# Patient Record
Sex: MALE
[De-identification: names, ages, dates, MRNs, and addresses within clinical notes are randomized per-mention and may not be internally consistent; named-entity substitution may affect disease eponyms.]

---

## 2021-07-01 PROBLEM — Z00.00 ENCOUNTER FOR PREVENTIVE HEALTH EXAMINATION: Status: ACTIVE | Noted: 2021-07-01

## 2021-07-02 ENCOUNTER — APPOINTMENT (OUTPATIENT)
Dept: ORTHOPEDIC SURGERY | Facility: CLINIC | Age: 31
End: 2021-07-02
Payer: SELF-PAY

## 2021-07-02 ENCOUNTER — NON-APPOINTMENT (OUTPATIENT)
Age: 31
End: 2021-07-02

## 2021-07-02 VITALS
SYSTOLIC BLOOD PRESSURE: 125 MMHG | DIASTOLIC BLOOD PRESSURE: 72 MMHG | WEIGHT: 195 LBS | BODY MASS INDEX: 25.03 KG/M2 | HEART RATE: 63 BPM | HEIGHT: 74 IN

## 2021-07-02 DIAGNOSIS — Z02.5 ENCOUNTER FOR EXAMINATION FOR PARTICIPATION IN SPORT: ICD-10-CM

## 2021-07-02 PROCEDURE — 99203 OFFICE O/P NEW LOW 30 MIN: CPT

## 2021-07-02 NOTE — HISTORY OF PRESENT ILLNESS
[de-identified] : Patient is here for sports physical.  Patient is participating in professional lacrosse league, and he needed a local physician to evaluate him and provide a sports physical and subsequent clearance to participate in lacrosse activity.  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  Patient has history of left shoulder labral repair performed while in college, he has had no issues since full recovery from that surgery.  Patient has no musculoskeletal complaints today.  Patient denies any past medical history, states he is not taking any medications at this time.  Patient feels well and ready to play lacrosse.\par \par The patient's past medical history, past surgical history, medications and allergies were reviewed by me today and documented accordingly. In addition, the patient's family and social history, which were noncontributory to this visit, were reviewed also. Intake form was reviewed. The patient has no family history of arthritis.

## 2021-07-02 NOTE — DISCUSSION/SUMMARY
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  He has no abnormal findings on clinical exam.  No disqualifying factors that would prevent him from participating in sports.  He is given full clearance to participate in sport activity at this time without restrictions.  Follow up as needed.  Patient appreciates and agrees with current plan.\par \par This note was generated using dragon medical dictation software.  A reasonable effort has been made for proofreading its contents, but typos may still remain.  If there are any questions or points of clarification needed please notify my office.\par

## 2021-07-02 NOTE — PHYSICAL EXAM
[de-identified] : Constitutional: Well-nourished, well-developed, No acute distress\par Respiratory:  Good respiratory effort, no SOB\par Lymphatic: No regional lymphadenopathy, no lymphedema\par Psychiatric: Pleasant and normal affect, alert and oriented x3\par Skin: Clean dry and intact B/L UE/LE\par Musculoskeletal: normal except where as noted in regional exam\par \par Neuro: CN II-XII Intact\par C5 - T1 intact to motor, DTRs 2+/4 biceps, triceps, brachioradialis\par L5 - S1 intact to sensation and motor, DTRs 2+/4 patella and achilles\par Sensation Intact to light touch throughout b/l UE & LE\par \par Cervical Spine Exam\par APPEARANCE: no marked deformities or malalignment, normal curvature, good posture\par NONTENDER: no bony midline tenderness, no marked tenderness in paracervicals or upper trapezius, no marked spasm.\par ROM: full & painless in all planes\par Thoracic spine:  normal curvature and normal alignment. good posture. no midline bony tenderness, no marked spasm. no marked tenderness in paraspinal muscles.  ROM full & painless all planes\par Lumbar Spine Exam\par \par APPEARANCE: no marked deformities or malalignment, normal curvature of the lumbosacral spine. no marked deformities, good posture.\par POSITIVE TENDERNESS: none\par NONTENDER: no bony midline tenderness, no marked tenderness in paravertebral muscles or erector spinae group, no marked spasm.  no marked tenderness in lumbar, lumbosacral, or iliac areas.\par ROM: full & painless in all planes\par \par MUSCULOSKELETAL: \par B/L Shoulders:  No asymmetry, malalignment, or swelling, Full ROM, 5/5 strength in flexion/ext, Abd/Add, IR/ER, Joints stable\par B/L Elbows:  No asymmetry, malalignment, or swelling, Full ROM, 5/5 strength in flexion/ext, pronation/supination, Joints stable\par B/L Wrist and Hand:  No asymmetry, malalignment, or swelling, Full ROM, 5/5 strength in wrist and long finger flexion/ext, radial/ulnar deviation, Joints stable\par B/L Hips: No asymmetry, malalignment, or swelling, Full ROM, 5/5 strength in flexion/ext, IR/ER, Abd/Add, Joints stable\par B/L Knees: No asymmetry, malalignment, or swelling, Full ROM, 5/5 strength in flexion/ext, Joints stable\par B/L Ankles: No asymmetry, malalignment, or swelling, Full ROM, 5/5 strength in DF/PF/Inv/Ev, Joints stable\par \par BIOMECHANICAL EXAM: no marked leg length discrepancy, Normal gait and station\par